# Patient Record
(demographics unavailable — no encounter records)

---

## 2025-02-06 NOTE — PHYSICAL EXAM
[Fully active, able to carry on all pre-disease performance without restriction] : Status 0 - Fully active, able to carry on all pre-disease performance without restriction [Normal] : affect appropriate [Thin] : thin [de-identified] : no dominant mass, nipple retraction or discharge [de-identified] : no gross focal motor deficits

## 2025-02-06 NOTE — ASSESSMENT
[FreeTextEntry1] : 9/3/24 GI note reviewed. #1) 4/2018-Stage IIA (T1cN1a) LEFT breast infiltrating ductal carcinoma positive/PA positive/HER2 negative (1-2+), FISH not amplified. S/P left lumpectomy/sentinel lymph node biopsy with a finding of 2 cm invasive poorly differentiated ductal carcinoma with 1/4 left sentinel lymph nodes involved with carcinoma. She ultimately saw Dr. Nicole Medina medical oncologist at [facility] [place] and began ddA-T chemotherapy regimen, then transferring her care to Dr. Chen. She completed 4 cycles of DD AC with growth factor support at [facility] [place], and then completed weekly paclitaxel x 12 at [facility] [place] on 7/27/18. She then received adjuvant XRT which she completed on 12/24/18. --1/2019-Began Anastrozole  --clinically MARÍA, though patient with concerns regarding weight loss (unclear amount)-as unintended, will obtain PET/CT scan-r/o recurrent neoplastic disease-patient wishes to pursue.  4/1/2024--Breast MRI-benign [id number] 2.  10/18/2023-Mammogram/breast US-benign findings, BI-RADS 2. Mammogram/breast US due. --Next breast imaging ordered.  #2) 2/16/[id number]. Age related with AI contribution. Has been receiving Prolia q 6 months-begun 4/2019.  --clinically stable for Prolia today --next BDT ordered  Patient was given the opportunity to ask questions.  Her questions have been answered to her apparent satisfaction at this time.  She expressed her understanding and willingness to comply with recommended follow-up.  -->Anastrozole 1 mg PO daily; Prolia 60 mg SQ Q 6 month. RTO 6 months.

## 2025-02-06 NOTE — HISTORY OF PRESENT ILLNESS
[Disease: _____________________] : Disease: [unfilled] [T: ___] : T[unfilled] [N: ___] : N[unfilled] [AJCC Stage: ____] : AJCC Stage: [unfilled] [de-identified] : 3/2018-Screening mammography, which demonstrated a new focal upper outer quadrant, left breast asymmetry with calcifications for which further diagnostic evaluation was recommended. A diagnostic mammogram confirmed a suspicious cluster of calcifications for which stereotactic core biopsy was recommended. An ultrasound showed a subtle 9 mm hypoechoic area at the 3 o'clock axis, 9 cm from nipple likely correlating to the mammographic abnormality. 4/12/2018-Left breast stereotactic core biopsy-pathology revealing moderate to poorly differentiated invasive ductal carcinoma associated with ductal carcinoma in situ and calcifications in 2 core biopsies, ER positive, DC positive, and HER-2/lorraine negative.  She ultimately underwent a left lumpectomy/sentinel lymph node biopsy with a finding of 2 cm invasive poorly differentiated ductal carcinoma with 1/4 left sentinel lymph nodes involved with carcinoma. She ultimately saw Dr. Nicole Medina medical oncologist at [facility] [place] and began dd AC-T chemotherapy regimen, then transferring her care to Dr. Chen. She completed 4 cycles of DD AC with growth factor support at [facility] [place], and then completed weekly paclitaxel x 12 at [facility] [place] on 7/27/2018.  She then received adjuvant XRT which she completed on 12/24/2018 (Dr. Blood)  She started anastrozole in 1/2019. Patient was under the oncology care of Dr. Chen until 1/2024.  Getting Prolia Q 6 months for her osteopenia.   [de-identified] : Invasive poorly differentiated ductal carcinoma [de-identified] : ER+/LA+/Her2-(1-2+), FISH negative, not amplified. [de-identified] : She had NAV+BSO in 12/2019.  [de-identified] : Sees orthopedist for back issues. S/P recent sinus infection-completed zithromax yesterday. Has asymmetric left hearing loss-saw ENT MD for this. No current dental/jaw complaints. No current pulmonary/ complaints. Takes hair supplements. Cares for elderly mother with dementia and cares for aunt. Works [employer] for [employer]. Takes CBD gummies. Feels she has had unintended weight loss-unclear amount. 2 Biologic children (one was from a donor egg), 1 stepson. H/O stress with daughter with some mental issues.

## 2025-02-06 NOTE — ASSESSMENT
[FreeTextEntry1] : 9/3/24 GI note reviewed. #1) 4/2018-Stage IIA (T1cN1a) LEFT breast infiltrating ductal carcinoma positive/OK positive/HER2 negative (1-2+), FISH not amplified. S/P left lumpectomy/sentinel lymph node biopsy with a finding of 2 cm invasive poorly differentiated ductal carcinoma with 1/4 left sentinel lymph nodes involved with carcinoma. She ultimately saw Dr. Nicole Medina medical oncologist at [facility] [place] and began ddA-T chemotherapy regimen, then transferring her care to Dr. Chen. She completed 4 cycles of DD AC with growth factor support at [facility] [place], and then completed weekly paclitaxel x 12 at [facility] [place] on 7/27/18. She then received adjuvant XRT which she completed on 12/24/18. --1/2019-Began Anastrozole  --clinically MARÍA, though patient with concerns regarding weight loss (unclear amount)-as unintended, will obtain PET/CT scan-r/o recurrent neoplastic disease-patient wishes to pursue.  4/1/2024--Breast MRI-benign [id number] 2.  10/18/2023-Mammogram/breast US-benign findings, BI-RADS 2. Mammogram/breast US due. --Next breast imaging ordered.  #2) 2/16/[id number]. Age related with AI contribution. Has been receiving Prolia q 6 months-begun 4/2019.  --clinically stable for Prolia today --next BDT ordered  Patient was given the opportunity to ask questions.  Her questions have been answered to her apparent satisfaction at this time.  She expressed her understanding and willingness to comply with recommended follow-up.  -->Anastrozole 1 mg PO daily; Prolia 60 mg SQ Q 6 month. RTO 6 months.

## 2025-02-06 NOTE — HISTORY OF PRESENT ILLNESS
[Disease: _____________________] : Disease: [unfilled] [T: ___] : T[unfilled] [N: ___] : N[unfilled] [AJCC Stage: ____] : AJCC Stage: [unfilled] [de-identified] : 3/2018-Screening mammography, which demonstrated a new focal upper outer quadrant, left breast asymmetry with calcifications for which further diagnostic evaluation was recommended. A diagnostic mammogram confirmed a suspicious cluster of calcifications for which stereotactic core biopsy was recommended. An ultrasound showed a subtle 9 mm hypoechoic area at the 3 o'clock axis, 9 cm from nipple likely correlating to the mammographic abnormality. 4/12/2018-Left breast stereotactic core biopsy-pathology revealing moderate to poorly differentiated invasive ductal carcinoma associated with ductal carcinoma in situ and calcifications in 2 core biopsies, ER positive, NV positive, and HER-2/lroraine negative.  She ultimately underwent a left lumpectomy/sentinel lymph node biopsy with a finding of 2 cm invasive poorly differentiated ductal carcinoma with 1/4 left sentinel lymph nodes involved with carcinoma. She ultimately saw Dr. Nicole Medina medical [place]ologist [place] [facility] [place] dd AC-T chemotherapy regimen, then transferring her care to Dr. Chen. She completed 4 cycles of DD AC with growth factor support [place] [facility] [place], and then completed weekly paclitaxel x 12 at [facility] [place] on 7/27/2018.  She then received adjuvant XRT which she completed on 12/24/2018 (Dr. Blood)  She started anastrozole in 1/2019. Patient was under the oncology care of Dr. Chen until 1/2024.  Getting Prolia Q 6 months for her osteopenia.   [de-identified] : Invasive poorly differentiated ductal carcinoma [de-identified] : ER+/WY+/Her2-(1-2+), FISH negative, not amplified. [de-identified] : She had NAV+BSO in 12/2019.  [de-identified] : Sees orthopedist for back issues. S/P recent sinus infection-completed zithromax yesterday. Has asymmetric left hearing loss-saw ENT MD for this. No current dental/jaw complaints. No current pulmonary/ complaints. Takes hair supplements. Cares for elderly mother with dementia and cares for aunt. Works [employer] for [employer]. Takes CBD gummies. Feels she has had unintended weight loss-unclear amount. 2 Biologic children (one was from a donor egg), 1 stepson. H/O stress with daughter with some mental issues.

## 2025-02-06 NOTE — HISTORY OF PRESENT ILLNESS
[Disease: _____________________] : Disease: [unfilled] [T: ___] : T[unfilled] [N: ___] : N[unfilled] [AJCC Stage: ____] : AJCC Stage: [unfilled] [de-identified] : 3/2018-Screening mammography, which demonstrated a new focal upper outer quadrant, left breast asymmetry with calcifications for which further diagnostic evaluation was recommended. A diagnostic mammogram confirmed a suspicious cluster of calcifications for which stereotactic core biopsy was recommended. An ultrasound showed a subtle 9 mm hypoechoic area at the 3 o'clock axis, 9 cm from nipple likely correlating to the mammographic abnormality. 4/12/2018-Left breast stereotactic core biopsy-pathology revealing moderate to poorly differentiated invasive ductal carcinoma associated with ductal carcinoma in situ and calcifications in 2 core biopsies, ER positive, WI positive, and HER-2/lorraine negative.  She ultimately underwent a left lumpectomy/sentinel lymph node biopsy with a finding of 2 cm invasive poorly differentiated ductal carcinoma with 1/4 left sentinel lymph nodes involved with carcinoma. She ultimately saw Dr. Nicole Medina medical [place]ologist [place] [facility] [place] and began dd AC-T chemotherapy regimen, then transferring her care to Dr. Chen. She completed 4 cycles of DD AC with growth factor [place] [facility] [place], and then completed weekly paclitaxel x 12 at [place] on 7/27/2018.  She then received adjuvant XRT which she completed on 12/24/2018 (Dr. Blood)  She started anastrozole in 1/2019. Patient was under the oncology care of Dr. Chen until 1/2024.  Getting Prolia Q 6 months for her osteopenia.   [de-identified] : Invasive poorly differentiated ductal carcinoma [de-identified] : ER+/WA+/Her2-(1-2+), FISH negative, not amplified. [de-identified] : She had NAV+BSO in 12/2019.  [de-identified] : Sees orthopedist for back issues. S/P recent sinus infection-completed zithromax yesterday. Has asymmetric left hearing loss-saw ENT MD for this. No current dental/jaw complaints. No current pulmonary/ complaints. Takes hair supplements. Cares for elderly mother with dementia and cares for aunt. Works [employer] for [employer]. Takes CBD gummies. Feels she has had unintended weight loss-unclear amount. 2 Biologic children (one was from a donor egg), 1 stepson. H/O stress with daughter with some mental issues.

## 2025-02-06 NOTE — ASSESSMENT
[FreeTextEntry1] : 9/3/24 GI note reviewed. #1) 4/2018-Stage IIA (T1cN1a) LEFT breast infiltrating ductal carcinoma positive/WV positive/HER2 negative (1-2+), FISH not amplified. S/P left lumpectomy/sentinel lymph node biopsy with a finding of 2 cm invasive poorly differentiated ductal carcinoma with 1/4 left sentinel lymph nodes involved with carcinoma. She ultimately saw Dr. Nicole Medina medical oncologist at [facility] [place] and began ddA-T chemotherapy regimen, then transferring her care to Dr. Chen. She completed 4 cycles of DD AC with growth factor support at [facility] [place], and then completed weekly paclitaxel x 12 at [facility] [place] on 7/27/18. She then received adjuvant XRT which she completed on 12/24/18. --1/2019-Began Anastrozole  --clinically MARÍA, though patient with concerns regarding weight loss (unclear amount)-as unintended, will obtain PET/CT scan-r/o recurrent neoplastic disease-patient wishes to pursue.  4/1/2024--Breast MRI-benign [id number] 2.  10/18/2023-Mammogram/breast US-benign findings, BI-RADS 2. Mammogram/breast US due. --Next breast imaging ordered.  #2) 2/16/[id number]. Age related with AI contribution. Has been receiving Prolia q 6 months-begun 4/2019.  --clinically stable for Prolia today --next BDT ordered  Patient was given the opportunity to ask questions.  Her questions have been answered to her apparent satisfaction at this time.  She expressed her understanding and willingness to comply with recommended follow-up.  -->Anastrozole 1 mg PO daily; Prolia 60 mg SQ Q 6 month. RTO 6 months.

## 2025-02-06 NOTE — PHYSICAL EXAM
[Fully active, able to carry on all pre-disease performance without restriction] : Status 0 - Fully active, able to carry on all pre-disease performance without restriction [Normal] : affect appropriate [Thin] : thin [de-identified] : no dominant mass, nipple retraction or discharge [de-identified] : no gross focal motor deficits

## 2025-02-06 NOTE — REVIEW OF SYSTEMS
[Loss of Hearing] : loss of hearing [Diarrhea: Grade 0] : Diarrhea: Grade 0 [Negative] : Allergic/Immunologic [FreeTextEntry4] : left side hearing loss